# Patient Record
Sex: FEMALE | Race: WHITE | Employment: FULL TIME | URBAN - METROPOLITAN AREA
[De-identification: names, ages, dates, MRNs, and addresses within clinical notes are randomized per-mention and may not be internally consistent; named-entity substitution may affect disease eponyms.]

---

## 2018-12-07 ENCOUNTER — HOSPITAL ENCOUNTER (EMERGENCY)
Age: 38
Discharge: HOME | End: 2018-12-07
Attending: EMERGENCY MEDICINE
Payer: COMMERCIAL

## 2018-12-07 ENCOUNTER — PATIENT OUTREACH (OUTPATIENT)
Dept: FAMILY MEDICINE CLINIC | Facility: CLINIC | Age: 38
End: 2018-12-07

## 2018-12-07 VITALS
RESPIRATION RATE: 15 BRPM | TEMPERATURE: 98 F | SYSTOLIC BLOOD PRESSURE: 121 MMHG | DIASTOLIC BLOOD PRESSURE: 75 MMHG | HEART RATE: 56 BPM | OXYGEN SATURATION: 100 %

## 2018-12-07 DIAGNOSIS — H66.002 ACUTE SUPPURATIVE OTITIS MEDIA OF LEFT EAR WITHOUT SPONTANEOUS RUPTURE OF TYMPANIC MEMBRANE, RECURRENCE NOT SPECIFIED: Primary | ICD-10-CM

## 2018-12-07 PROCEDURE — 99283 EMERGENCY DEPT VISIT LOW MDM: CPT | Performed by: EMERGENCY MEDICINE

## 2018-12-07 RX ORDER — HYDROCODONE BITARTRATE AND ACETAMINOPHEN 5; 325 MG/1; MG/1
1-2 TABLET ORAL EVERY 4 HOURS PRN
Qty: 20 TABLET | Refills: 0 | Status: SHIPPED | OUTPATIENT
Start: 2018-12-07 | End: 2018-12-14

## 2018-12-07 RX ORDER — AZITHROMYCIN 250 MG/1
TABLET, FILM COATED ORAL
Qty: 1 PACKAGE | Refills: 0 | Status: SHIPPED | OUTPATIENT
Start: 2018-12-07 | End: 2018-12-12

## 2018-12-07 NOTE — ED PROVIDER NOTES
Patient Seen in: THE [facility] In [place]    History   Patient presents with:  Ear Problem Pain (neurosensory): left ear, no discharge    Stated Complaint:     HPI    Patient who is 8 weeks pregnant, no complaints referable to pregnancy, of left ear without spontaneous rupture of tympanic membrane, recurrence not specified  (primary encounter diagnosis)    Disposition:  Discharge  12/7/2018  1:00 am    Follow-up:  Mk Espinoza, [address] [address] [address] [place]

## 2018-12-07 NOTE — ED INITIAL ASSESSMENT (HPI)
Pt presents w/ severe ear pain w/ onset of 5-6 hours PTA in ER. Pt is 8 weeks pregnant and has only taken tylenol for pain. Pt states that ear pain is 9/10. No NVD, no loc, no dizzy. Pt denies fever and no discharge from left ear.

## 2024-02-02 ENCOUNTER — OFFICE VISIT (OUTPATIENT)
Dept: FAMILY MEDICINE CLINIC | Facility: CLINIC | Age: 44
End: 2024-02-02
Payer: COMMERCIAL

## 2024-02-02 VITALS
TEMPERATURE: 98 F | OXYGEN SATURATION: 100 % | RESPIRATION RATE: 16 BRPM | BODY MASS INDEX: 25.76 KG/M2 | HEIGHT: 62 IN | DIASTOLIC BLOOD PRESSURE: 68 MMHG | HEART RATE: 70 BPM | WEIGHT: 140 LBS | SYSTOLIC BLOOD PRESSURE: 106 MMHG

## 2024-02-02 DIAGNOSIS — H66.91 RIGHT OTITIS MEDIA, UNSPECIFIED OTITIS MEDIA TYPE: Primary | ICD-10-CM

## 2024-02-02 PROCEDURE — 3008F BODY MASS INDEX DOCD: CPT | Performed by: NURSE PRACTITIONER

## 2024-02-02 PROCEDURE — 3074F SYST BP LT 130 MM HG: CPT | Performed by: NURSE PRACTITIONER

## 2024-02-02 PROCEDURE — 99202 OFFICE O/P NEW SF 15 MIN: CPT | Performed by: NURSE PRACTITIONER

## 2024-02-02 PROCEDURE — 3078F DIAST BP <80 MM HG: CPT | Performed by: NURSE PRACTITIONER

## 2024-02-02 RX ORDER — AMOXICILLIN AND CLAVULANATE POTASSIUM 875; 125 MG/1; MG/1
1 TABLET, FILM COATED ORAL 2 TIMES DAILY
Qty: 20 TABLET | Refills: 0 | Status: SHIPPED | OUTPATIENT
Start: 2024-02-02 | End: 2024-02-12

## 2024-02-02 RX ORDER — AMOXICILLIN 875 MG/1
875 TABLET, COATED ORAL 2 TIMES DAILY
COMMUNITY
Start: 2024-01-31 | End: 2024-02-05 | Stop reason: ALTCHOICE

## 2024-02-02 NOTE — PROGRESS NOTES
CHIEF COMPLAINT:     Chief Complaint   Patient presents with    Ear Problem     Right ear pain, chills, low-grade fever, congestion, cough, x1 week     Pt started taking amoxicillin Wednesday night with no improvement        HPI:   Florecita Harris is a 43 year old female who presents to clinic today with complaints of right ear pain. Has had for several days, started on amoxicillin 2 nights ago, has taken 4 doses, ear pain is worse, last night pt had chills, 45 min of feeling cold, shivering. Patient reports history of ear infections. Home treatment includes ibu.     Associated symptoms:  Patient denies decreased hearing. Patient denies hearing loss. Patient denies drainage. Patient denies use of cotton tipped ear swabs to clean the ears. Patient reports following URI symptoms: cough, nasal congestion x 1 week.    Current Outpatient Medications   Medication Sig Dispense Refill    amoxicillin 875 MG Oral Tab Take 1 tablet (875 mg total) by mouth 2 (two) times daily.      amoxicillin clavulanate 875-125 MG Oral Tab Take 1 tablet by mouth 2 (two) times daily for 10 days. 20 tablet 0      History reviewed. No pertinent past medical history.   Social History:  Social History     Socioeconomic History    Marital status:         REVIEW OF SYSTEMS:   GENERAL: See HPI  SKIN: no unusual skin lesions or rashes  HEENT: See HPI  LUNGS: No shortness of breath, or wheezing.  CARDIOVASCULAR: No chest pain, palpitations  GI: No N/V/C/D.  NEURO: denies headaches or dizziness    EXAM:   /68   Pulse 70   Temp 97.9 °F (36.6 °C) (Temporal)   Resp 16   Ht 5' 2\" (1.575 m)   Wt 140 lb (63.5 kg)   LMP 01/22/2024 (Approximate)   SpO2 100%   BMI 25.61 kg/m²   GENERAL: well developed, well nourished,in no apparent distress  SKIN: no rashes  HEAD: atraumatic, normocephalic  EYES: conjunctiva clear, EOM intact  EARS: bilat tragus non tender with manipulation.  External auditory canals clear. Right TM: + erythema, +  bulging, no retraction, + effusion, bony landmarks obscured.  Left TM: grey, no bulging, no  retraction, no effusion, bony landmarks visible.  NOSE: nostrils patent, no nasal mucus, nasal mucosa pink, moist  THROAT: oral mucosa pink, moist. Posterior pharynx not erythematous or injected. No exudates.  NECK: supple, non-tender  LUNGS: clear to auscultation bilaterally, no wheezes or rhonchi. Breathing is non labored.  CARDIO: RRR without murmur  EXTREMITIES: no cyanosis, clubbing or edema  LYMPH: no cervical lymphadenopathy.    PSYCH: pleasant mood and affect  NEURO: no focal deficits    ASSESSMENT AND PLAN:   Florecita Harris is a 43 year old female who presents with ear problems symptoms are consistent with    ASSESSMENT:  Encounter Diagnosis   Name Primary?    Right otitis media, unspecified otitis media type Yes       PLAN:   Meds as listed below.  Stop amoxicillin  Comfort measures as described in Patient Instructions    Meds & Refills for this Visit:  Requested Prescriptions     Signed Prescriptions Disp Refills    amoxicillin clavulanate 875-125 MG Oral Tab 20 tablet 0     Sig: Take 1 tablet by mouth 2 (two) times daily for 10 days.         Risk and benefits of medication discussed.   If antibiotics prescribed, stressed importance of completing full course of antibiotic.     Acetaminophen or NSAID prn pain.      Follow up with PCP if s/sx worsen, do not begin to improve in 3 days, or if fever of 100.4 or greater persists for 72 hours.    Patient voiced understand and is in agreement with treatment plan.    There are no Patient Instructions on file for this visit.

## 2024-02-04 ENCOUNTER — HOSPITAL ENCOUNTER (EMERGENCY)
Age: 44
Discharge: LEFT BEFORE DISCHARGE | End: 2024-02-04
Payer: COMMERCIAL

## 2024-02-05 ENCOUNTER — OFFICE VISIT (OUTPATIENT)
Dept: FAMILY MEDICINE CLINIC | Facility: CLINIC | Age: 44
End: 2024-02-05
Payer: COMMERCIAL

## 2024-02-05 VITALS
HEIGHT: 62 IN | OXYGEN SATURATION: 100 % | WEIGHT: 144.38 LBS | RESPIRATION RATE: 16 BRPM | BODY MASS INDEX: 26.57 KG/M2 | SYSTOLIC BLOOD PRESSURE: 120 MMHG | DIASTOLIC BLOOD PRESSURE: 70 MMHG | HEART RATE: 52 BPM | TEMPERATURE: 97 F

## 2024-02-05 DIAGNOSIS — H65.191 OTHER NON-RECURRENT ACUTE NONSUPPURATIVE OTITIS MEDIA OF RIGHT EAR: Primary | ICD-10-CM

## 2024-02-05 RX ORDER — NAPROXEN 500 MG/1
500 TABLET ORAL 2 TIMES DAILY WITH MEALS
Qty: 14 TABLET | Refills: 0 | Status: SHIPPED | OUTPATIENT
Start: 2024-02-05 | End: 2024-02-12

## 2024-02-05 RX ORDER — CEFDINIR 300 MG/1
300 CAPSULE ORAL 2 TIMES DAILY
Qty: 20 CAPSULE | Refills: 0 | Status: SHIPPED | OUTPATIENT
Start: 2024-02-05 | End: 2024-02-15

## 2024-02-05 RX ORDER — MOMETASONE FUROATE 50 UG/1
SPRAY, METERED NASAL DAILY
COMMUNITY

## 2024-02-05 NOTE — PROGRESS NOTES
Subjective:      Chief Complaint   Patient presents with    Ear Problem     Congestion last week Tuesday started with ear pain - was given amox - went to [facility] and was given augmentin on 2/2/24 - states she does not feel better - states neck is sore and still has ear, has been taking trylenol/ibuprofen - went to ER yesterday but left     HISTORY OF PRESENT ILLNESS  Ear Problem   Associated symptoms include neck pain.     HPI obtained per patient report.  Florecita Harris is a pleasant 43 year old female presenting with R ear pain.     Nasal congestion, chills, R ear pain, and R neck pain began 1/30. She was prescribed amoxicillin through [facility], but experienced worsening of symptoms. She was then evaluated at [facility] on 2/2 and prescribed Augmentin. She has taken 6 doses of augmentin so far; her nasal congestion and chills have resolved but her ear and neck pain have not been improving. Pain temporarily responds to tylenol and ibuprofen but returns once the duration of effect of these medications have ended. She denies any fever/neck stiffness.    PAST PATIENT HISTORY  History reviewed. No pertinent past medical history.  History reviewed. No pertinent surgical history.    CURRENT MEDICATIONS  Outpatient Medications Marked as Taking for the 2/5/24 encounter (Office Visit) with Ben Venegas MD   Medication Sig Dispense Refill    mometasone furoate 50 MCG/ACT Nasal Suspension by Nasal route daily.      cefdinir 300 MG Oral Cap Take 1 capsule (300 mg total) by mouth 2 (two) times daily for 10 days. 20 capsule 0    naproxen 500 MG Oral Tab Take 1 tablet (500 mg total) by mouth 2 (two) times daily with meals for 7 days. 14 tablet 0    amoxicillin clavulanate 875-125 MG Oral Tab Take 1 tablet by mouth 2 (two) times daily for 10 days. 20 tablet 0       HEALTH MAINTENANCE  Immunization History   Administered Date(s) Administered    >=3 YRS TRI  MULTIDOSE VIAL (79412) FLU CLINIC 09/09/2015, 10/19/2016    Covid-19 Vaccine  Moderna 100 mcg/0.5 ml 02/02/2021, 03/04/2021    FLUZONE 6 months and older PFS 0.5 ml (27970) 09/26/2017, 10/18/2018, 10/17/2019, 10/06/2022, 10/19/2023    Flublok Quad Influenza Vaccine (93885) 10/14/2020, 11/01/2021    MMR 04/24/2020    TDAP 07/26/2017, 03/12/2020, 02/17/2022       ALLERGIES AND DRUG REACTIONS  Allergies   Allergen Reactions    Seasonal OTHER (SEE COMMENTS)       History reviewed. No pertinent family history.  Social History     Socioeconomic History    Marital status:    Tobacco Use    Smoking status: Never    Smokeless tobacco: Never   Vaping Use    Vaping Use: Never used   Substance and Sexual Activity    Alcohol use: Yes    Drug use: Never       Review of Systems   HENT:  Positive for ear pain.    Musculoskeletal:  Positive for neck pain.   All other systems reviewed and are negative.         Objective:      /70   Pulse 52   Temp 97 °F (36.1 °C) (Temporal)   Resp 16   Ht 5' 2\" (1.575 m)   Wt 144 lb 6 oz (65.5 kg)   LMP 01/22/2024 (Approximate)   SpO2 100%   BMI 26.41 kg/m²   Body mass index is 26.41 kg/m².    Physical Exam  Vitals reviewed.   Constitutional:       General: She is not in acute distress.     Appearance: She is not ill-appearing, toxic-appearing or diaphoretic.   HENT:      Head: Normocephalic and atraumatic.      Right Ear: Ear canal and external ear normal.      Left Ear: Tympanic membrane, ear canal and external ear normal.      Ears:      Comments: R TM erythematous and bulging. No mastoid tenderness  Eyes:      General: No scleral icterus.        Right eye: No discharge.         Left eye: No discharge.      Extraocular Movements: Extraocular movements intact.      Conjunctiva/sclera: Conjunctivae normal.      Pupils: Pupils are equal, round, and reactive to light.   Cardiovascular:      Rate and Rhythm: Normal rate.   Pulmonary:      Effort: Pulmonary effort is normal.   Abdominal:      General: There is no distension.   Musculoskeletal:      Cervical  back: Normal range of motion and neck supple. No rigidity or tenderness.   Lymphadenopathy:      Head:      Right side of head: Posterior auricular (tender postauricular LAD) adenopathy present.      Cervical: No cervical adenopathy.   Skin:     General: Skin is warm and dry.      Coloration: Skin is not jaundiced or pale.      Findings: No bruising, erythema or rash.   Neurological:      General: No focal deficit present.      Mental Status: She is alert and oriented to person, place, and time.            Assessment and Plan:      1. Other non-recurrent acute nonsuppurative otitis media of right ear (Primary)  -     Cefdinir; Take 1 capsule (300 mg total) by mouth 2 (two) times daily for 10 days.  Dispense: 20 capsule; Refill: 0  -     Naproxen; Take 1 tablet (500 mg total) by mouth 2 (two) times daily with meals for 7 days.  Dispense: 14 tablet; Refill: 0    Return if symptoms worsen or fail to improve.    - inadequate response to amoxicillin and augmentin  - recommended cefdinir as prescribed  - if symptoms do not improve within 48-72 hours of starting cefdinir or if symptoms worsen, recommended further treatment with IV antibiotics in the ER  - discussed that she may take naproxen instead of ibuprofen as needed for pain    Patient verbalized understanding of assessment and recommendations. All questions and concerns were addressed.    Electronically signed by Ben Venegas MD

## 2024-04-26 ENCOUNTER — OFFICE VISIT (OUTPATIENT)
Dept: FAMILY MEDICINE CLINIC | Facility: CLINIC | Age: 44
End: 2024-04-26
Payer: COMMERCIAL

## 2024-04-26 VITALS
OXYGEN SATURATION: 100 % | BODY MASS INDEX: 26.31 KG/M2 | WEIGHT: 143 LBS | TEMPERATURE: 98 F | RESPIRATION RATE: 16 BRPM | HEART RATE: 47 BPM | SYSTOLIC BLOOD PRESSURE: 107 MMHG | HEIGHT: 62 IN | DIASTOLIC BLOOD PRESSURE: 70 MMHG

## 2024-04-26 DIAGNOSIS — J06.9 VIRAL UPPER RESPIRATORY ILLNESS: Primary | ICD-10-CM

## 2024-04-26 PROCEDURE — 3008F BODY MASS INDEX DOCD: CPT | Performed by: NURSE PRACTITIONER

## 2024-04-26 PROCEDURE — 3078F DIAST BP <80 MM HG: CPT | Performed by: NURSE PRACTITIONER

## 2024-04-26 PROCEDURE — 3074F SYST BP LT 130 MM HG: CPT | Performed by: NURSE PRACTITIONER

## 2024-04-26 PROCEDURE — 99213 OFFICE O/P EST LOW 20 MIN: CPT | Performed by: NURSE PRACTITIONER

## 2024-04-26 NOTE — PROGRESS NOTES
CHIEF COMPLAINT:     Chief Complaint   Patient presents with    Ear Problem     I have a cough, sinus congestion, and ear pain. - Entered by patient  Right ear pain for 2 days.  With sinus congestion, mouth sores,  and cough.  Otc meds taken.         HPI:   Florecita Harris is a 43 year old female who presents for ill symptoms for 5 days. Patient reports cough ,sinus pressure, congestion and right ear pain. Denies fever. Symptoms have been worsening since onset.  Treating symptoms with allergy pill, mucinex and flonase. History ear on infection.      Current Outpatient Medications   Medication Sig Dispense Refill    mometasone furoate 50 MCG/ACT Nasal Suspension by Nasal route daily.        History reviewed. No pertinent past medical history.   History reviewed. No pertinent surgical history.      Social History     Socioeconomic History    Marital status:    Tobacco Use    Smoking status: Never    Smokeless tobacco: Never   Vaping Use    Vaping status: Never Used   Substance and Sexual Activity    Alcohol use: Yes    Drug use: Never         REVIEW OF SYSTEMS:   GENERAL: feels well otherwise, good appetite  SKIN: no rashes or abnormal skin lesions  HEENT: See HPI  LUNGS: denies shortness of breath or wheezing, See HPI  CARDIOVASCULAR: denies chest pain or palpitations   GI: denies N/V/C or abdominal pain  NEURO: Denies headaches    EXAM:   /70   Pulse (!) 47   Temp 98 °F (36.7 °C) (Oral)   Resp 16   Ht 5' 2\" (1.575 m)   Wt 143 lb (64.9 kg)   LMP 04/17/2024 (Exact Date)   SpO2 100%   BMI 26.16 kg/m²   GENERAL: well developed, well nourished, in no apparent distress  SKIN: no rashes, no suspicious lesions  HEENT: atraumatic, normocephalic. conjunctiva clear. TM's gray, no bulging, no retraction, + fluid, bony landmarks intact. clear nasal discharge, nasal mucosa erythematous and swollen. Oral mucosa pink, moist. Posterior pharynx is not erythematous. no exudates. Tonsils 2/4.   THROAT:NECK:  Supple, non-tender  LUNGS: clear to auscultation   CARDIO: RRR without murmur  EXTREMITIES: no cyanosis, clubbing or edema  LYMP: bilateral anterior cervical lymphadenopathy.    ASSESSMENT AND PLAN:   Florecita Harris is a 43 year old female who presents with     Florecita was seen today for ear problem.    Diagnoses and all orders for this visit:    Viral upper respiratory illness     Continue supportive care  History of bradycardia, no dizziness.       Risks, benefits, and side effects of medication explained and discussed.    Discussed physical exam and hpi with pt. No bacterial focus noted on exam. Pt has reassuring physical exam consistent with viral uri. Lungs clear bilat. No respiratory distress noted. Treatment options discussed with patient and explained in detail. We reviewed symptomatic care at home. The risks, benefits and potential side effects of possible medications were reviewed. Alternatives were discussed. Monitoring parameters and expected course outlined. Patient to call PCP or go to emergency department if symptoms fail to respond as outlined, or worsen in any way. The patient agreed with the plan.  See Patient Handout    The patient indicates understanding of these issues and agrees to the plan.  The patient is asked to follow up with PCP if sx's persist or worsen.

## 2024-04-30 ENCOUNTER — OFFICE VISIT (OUTPATIENT)
Dept: FAMILY MEDICINE CLINIC | Facility: CLINIC | Age: 44
End: 2024-04-30
Payer: COMMERCIAL

## 2024-04-30 VITALS
SYSTOLIC BLOOD PRESSURE: 120 MMHG | RESPIRATION RATE: 16 BRPM | DIASTOLIC BLOOD PRESSURE: 60 MMHG | OXYGEN SATURATION: 99 % | WEIGHT: 141 LBS | HEART RATE: 67 BPM | HEIGHT: 62 IN | TEMPERATURE: 97 F | BODY MASS INDEX: 25.95 KG/M2

## 2024-04-30 DIAGNOSIS — Z91.09 ALLERGY TO ENVIRONMENTAL FACTORS: ICD-10-CM

## 2024-04-30 DIAGNOSIS — J01.00 ACUTE NON-RECURRENT MAXILLARY SINUSITIS: Primary | ICD-10-CM

## 2024-04-30 PROCEDURE — 99213 OFFICE O/P EST LOW 20 MIN: CPT | Performed by: STUDENT IN AN ORGANIZED HEALTH CARE EDUCATION/TRAINING PROGRAM

## 2024-04-30 PROCEDURE — 3078F DIAST BP <80 MM HG: CPT | Performed by: STUDENT IN AN ORGANIZED HEALTH CARE EDUCATION/TRAINING PROGRAM

## 2024-04-30 PROCEDURE — 96127 BRIEF EMOTIONAL/BEHAV ASSMT: CPT | Performed by: STUDENT IN AN ORGANIZED HEALTH CARE EDUCATION/TRAINING PROGRAM

## 2024-04-30 PROCEDURE — 3008F BODY MASS INDEX DOCD: CPT | Performed by: STUDENT IN AN ORGANIZED HEALTH CARE EDUCATION/TRAINING PROGRAM

## 2024-04-30 PROCEDURE — 3074F SYST BP LT 130 MM HG: CPT | Performed by: STUDENT IN AN ORGANIZED HEALTH CARE EDUCATION/TRAINING PROGRAM

## 2024-04-30 RX ORDER — CEFDINIR 300 MG/1
300 CAPSULE ORAL 2 TIMES DAILY
Qty: 14 CAPSULE | Refills: 0 | Status: SHIPPED | OUTPATIENT
Start: 2024-04-30 | End: 2024-05-07

## 2024-04-30 NOTE — PROGRESS NOTES
Subjective:      Chief Complaint   Patient presents with    Cough     X 1 week - [facility] - states she might have a sinus infection - has nasal congestion, mucus - denies any fever - has been taking allegra without decongestant for a month - took tylenol severe cold and cough and allegra last night     HISTORY OF PRESENT ILLNESS  Cough      HPI obtained per patient report.  Florecita Harris is a pleasant 43 year old female presenting with URI symptoms.   She reports sinus pain, nasal congestion with yellow nasal discharge, R ear pressure, and wet cough for over 1 week. She was seen at [facility] on 4/26. She states that her sinus symptoms have been worsening over time.  She is concerned about her frequent upper respiratory tract/ear infections over the past 6 months.    PAST PATIENT HISTORY  History reviewed. No pertinent past medical history.  History reviewed. No pertinent surgical history.    CURRENT MEDICATIONS  Outpatient Medications Marked as Taking for the 4/30/24 encounter (Office Visit) with Ben Venegas MD   Medication Sig Dispense Refill    cefdinir 300 MG Oral Cap Take 1 capsule (300 mg total) by mouth 2 (two) times daily for 7 days. 14 capsule 0    mometasone furoate 50 MCG/ACT Nasal Suspension by Nasal route daily.         HEALTH MAINTENANCE  Immunization History   Administered Date(s) Administered    >=3 YRS TRI  MULTIDOSE VIAL (03853) FLU CLINIC 09/09/2015, 10/19/2016    Covid-19 Vaccine Moderna 100 mcg/0.5 ml 02/02/2021, 03/04/2021    FLUZONE 6 months and older PFS 0.5 ml (05873) 09/26/2017, 10/18/2018, 10/17/2019, 10/06/2022, 10/19/2023    Flublok Quad Influenza Vaccine (38340) 10/14/2020, 11/01/2021    MMR 04/24/2020    TDAP 07/26/2017, 03/12/2020, 02/17/2022       ALLERGIES AND DRUG REACTIONS  Allergies   Allergen Reactions    Seasonal OTHER (SEE COMMENTS)       History reviewed. No pertinent family history.  Social History     Socioeconomic History    Marital  status:    Tobacco Use    Smoking status: Never    Smokeless tobacco: Never   Vaping Use    Vaping status: Never Used   Substance and Sexual Activity    Alcohol use: Yes    Drug use: Never       Review of Systems   HENT:  Positive for congestion and sinus pain.    Respiratory:  Positive for cough.    All other systems reviewed and are negative.         Objective:      /60   Pulse 67   Temp 97 °F (36.1 °C) (Temporal)   Resp 16   Ht 5' 2\" (1.575 m)   Wt 141 lb (64 kg)   LMP 04/17/2024 (Approximate)   SpO2 99%   BMI 25.79 kg/m²   Body mass index is 25.79 kg/m².    Physical Exam  Vitals reviewed.   Constitutional:       General: She is not in acute distress.     Appearance: She is not ill-appearing, toxic-appearing or diaphoretic.   HENT:      Head: Normocephalic and atraumatic.      Right Ear: Tympanic membrane, ear canal and external ear normal.      Left Ear: Tympanic membrane, ear canal and external ear normal.   Eyes:      General: No scleral icterus.        Right eye: No discharge.         Left eye: No discharge.      Extraocular Movements: Extraocular movements intact.      Conjunctiva/sclera: Conjunctivae normal.   Cardiovascular:      Rate and Rhythm: Normal rate and regular rhythm.      Heart sounds: Normal heart sounds.   Pulmonary:      Effort: Pulmonary effort is normal.      Breath sounds: Normal breath sounds.   Abdominal:      General: There is no distension.   Musculoskeletal:      Cervical back: Neck supple.   Neurological:      Mental Status: She is alert and oriented to person, place, and time.            Assessment and Plan:      1. Acute non-recurrent maxillary sinusitis (Primary)  -     Cefdinir; Take 1 capsule (300 mg total) by mouth 2 (two) times daily for 7 days.  Dispense: 14 capsule; Refill: 0  2. Allergy to environmental factors  -     Allergy Referral - External    No follow-ups on file.    Acute sinusitis  - cefdinir as prescribed    Environmental allergies  - her  allergy shots were discontinued recently. Baseline inflammation and drainage may be a contributor to her recent frequent upper respiratory/ear infections. She is agreeable to consulting with an allergist again  - there may be other contributing factors to her frequent infections including working in a school setting, having young kids at home, post Covid-19 syndrome, decline in immunity due to stress or age, etc., but we discussed that these factors are not as feasibly modifiable as her environmental allergies    Patient verbalized understanding of assessment and recommendations. All questions and concerns were addressed.    Electronically signed by Ben Venegas MD

## 2024-11-01 ENCOUNTER — MED REC SCAN ONLY (OUTPATIENT)
Dept: FAMILY MEDICINE CLINIC | Facility: CLINIC | Age: 44
End: 2024-11-01

## 2024-11-13 ENCOUNTER — APPOINTMENT (OUTPATIENT)
Dept: DERMATOLOGY | Age: 44
End: 2024-11-13

## 2024-11-13 DIAGNOSIS — L85.3 ASTEATOSIS: ICD-10-CM

## 2024-11-13 DIAGNOSIS — D23.5 BENIGN NEOPLASM OF SKIN OF TRUNK, EXCEPT SCROTUM: Primary | ICD-10-CM

## 2024-11-13 DIAGNOSIS — L57.0 KERATOSIS, ACTINIC: ICD-10-CM

## 2024-11-13 DIAGNOSIS — L72.0 MILIUM: ICD-10-CM

## 2024-11-13 DIAGNOSIS — L81.0 POSTINFLAMMATORY HYPERPIGMENTATION: ICD-10-CM

## 2024-11-13 RX ORDER — MOMETASONE FUROATE MONOHYDRATE 50 UG/1
SPRAY, METERED NASAL
COMMUNITY

## 2024-11-13 RX ORDER — FEXOFENADINE HCL 180 MG/1
180 TABLET ORAL DAILY
COMMUNITY

## 2024-11-19 LAB
ASR DISCLAIMER: NORMAL
CASE RPRT: NORMAL
CLINICAL INFO: NORMAL
PATH REPORT.FINAL DX SPEC: NORMAL
PATH REPORT.GROSS SPEC: NORMAL

## 2024-12-11 ENCOUNTER — APPOINTMENT (OUTPATIENT)
Dept: DERMATOLOGY | Age: 44
End: 2024-12-11

## 2024-12-11 DIAGNOSIS — L57.0 KERATOSIS, ACTINIC: Primary | ICD-10-CM

## 2024-12-11 PROCEDURE — 17000 DESTRUCT PREMALG LESION: CPT | Performed by: DERMATOLOGY

## 2025-04-09 ENCOUNTER — APPOINTMENT (OUTPATIENT)
Dept: DERMATOLOGY | Age: 45
End: 2025-04-09

## (undated) NOTE — ED AVS SNAPSHOT
Sree Girard   MRN: [id number]    Department:  [facility] in [place]   Date of Visit:  12/7/2018           Disclosure     Insurance plans vary and the physician(s) referred by the ER may not be covered by your plan.  Please c tell this physician (or your personal doctor if your instructions are to return to your personal doctor) about any new or lasting problems. The primary care or specialist physician will see patients referred from the [facility] Emergency Department.  Saud Gramajo